# Patient Record
Sex: MALE | Race: WHITE | NOT HISPANIC OR LATINO | Employment: OTHER | URBAN - METROPOLITAN AREA
[De-identification: names, ages, dates, MRNs, and addresses within clinical notes are randomized per-mention and may not be internally consistent; named-entity substitution may affect disease eponyms.]

---

## 2024-05-18 ENCOUNTER — HOSPITAL ENCOUNTER (EMERGENCY)
Facility: HOSPITAL | Age: 74
Discharge: HOME | End: 2024-05-18
Attending: EMERGENCY MEDICINE
Payer: COMMERCIAL

## 2024-05-18 ENCOUNTER — APPOINTMENT (EMERGENCY)
Dept: CT IMAGING | Facility: HOSPITAL | Age: 74
End: 2024-05-18
Payer: COMMERCIAL

## 2024-05-18 VITALS
WEIGHT: 175 LBS | HEIGHT: 69 IN | BODY MASS INDEX: 25.92 KG/M2 | HEART RATE: 68 BPM | OXYGEN SATURATION: 99 % | DIASTOLIC BLOOD PRESSURE: 88 MMHG | TEMPERATURE: 97.9 F | SYSTOLIC BLOOD PRESSURE: 156 MMHG | RESPIRATION RATE: 18 BRPM

## 2024-05-18 DIAGNOSIS — N32.89 BLADDER MASS: Primary | ICD-10-CM

## 2024-05-18 DIAGNOSIS — R31.9 HEMATURIA: ICD-10-CM

## 2024-05-18 LAB
ALBUMIN SERPL BCP-MCNC: 4 G/DL (ref 3.5–5)
ALP SERPL-CCNC: 30 U/L (ref 34–104)
ALT SERPL W P-5'-P-CCNC: 23 U/L (ref 7–52)
ANION GAP SERPL CALCULATED.3IONS-SCNC: 5 MMOL/L (ref 4–13)
AST SERPL W P-5'-P-CCNC: 45 U/L (ref 13–39)
BACTERIA UR QL AUTO: ABNORMAL /HPF
BASOPHILS # BLD AUTO: 0.03 THOUSANDS/ÂΜL (ref 0–0.1)
BASOPHILS NFR BLD AUTO: 1 % (ref 0–1)
BILIRUB SERPL-MCNC: 0.78 MG/DL (ref 0.2–1)
BILIRUB UR QL STRIP: NEGATIVE
BUN SERPL-MCNC: 19 MG/DL (ref 5–25)
CALCIUM SERPL-MCNC: 8.9 MG/DL (ref 8.4–10.2)
CHLORIDE SERPL-SCNC: 108 MMOL/L (ref 96–108)
CK SERPL-CCNC: 824 U/L (ref 39–308)
CLARITY UR: CLEAR
CO2 SERPL-SCNC: 26 MMOL/L (ref 21–32)
COLOR UR: COLORLESS
CREAT SERPL-MCNC: 0.83 MG/DL (ref 0.6–1.3)
EOSINOPHIL # BLD AUTO: 0.15 THOUSAND/ÂΜL (ref 0–0.61)
EOSINOPHIL NFR BLD AUTO: 3 % (ref 0–6)
ERYTHROCYTE [DISTWIDTH] IN BLOOD BY AUTOMATED COUNT: 13.2 % (ref 11.6–15.1)
GFR SERPL CREATININE-BSD FRML MDRD: 86 ML/MIN/1.73SQ M
GLUCOSE SERPL-MCNC: 97 MG/DL (ref 65–140)
GLUCOSE UR STRIP-MCNC: NEGATIVE MG/DL
HCT VFR BLD AUTO: 42.1 % (ref 36.5–49.3)
HGB BLD-MCNC: 14.8 G/DL (ref 12–17)
HGB UR QL STRIP.AUTO: ABNORMAL
IMM GRANULOCYTES # BLD AUTO: 0.01 THOUSAND/UL (ref 0–0.2)
IMM GRANULOCYTES NFR BLD AUTO: 0 % (ref 0–2)
KETONES UR STRIP-MCNC: NEGATIVE MG/DL
LEUKOCYTE ESTERASE UR QL STRIP: NEGATIVE
LYMPHOCYTES # BLD AUTO: 1.21 THOUSANDS/ÂΜL (ref 0.6–4.47)
LYMPHOCYTES NFR BLD AUTO: 27 % (ref 14–44)
MCH RBC QN AUTO: 32.9 PG (ref 26.8–34.3)
MCHC RBC AUTO-ENTMCNC: 35.2 G/DL (ref 31.4–37.4)
MCV RBC AUTO: 94 FL (ref 82–98)
MONOCYTES # BLD AUTO: 0.78 THOUSAND/ÂΜL (ref 0.17–1.22)
MONOCYTES NFR BLD AUTO: 18 % (ref 4–12)
MUCOUS THREADS UR QL AUTO: ABNORMAL
NEUTROPHILS # BLD AUTO: 2.28 THOUSANDS/ÂΜL (ref 1.85–7.62)
NEUTS SEG NFR BLD AUTO: 51 % (ref 43–75)
NITRITE UR QL STRIP: NEGATIVE
NON-SQ EPI CELLS URNS QL MICRO: ABNORMAL /HPF
NRBC BLD AUTO-RTO: 0 /100 WBCS
PH UR STRIP.AUTO: 6 [PH]
PLATELET # BLD AUTO: 194 THOUSANDS/UL (ref 149–390)
PMV BLD AUTO: 9.8 FL (ref 8.9–12.7)
POTASSIUM SERPL-SCNC: 4.1 MMOL/L (ref 3.5–5.3)
PROT SERPL-MCNC: 6.5 G/DL (ref 6.4–8.4)
PROT UR STRIP-MCNC: ABNORMAL MG/DL
RBC # BLD AUTO: 4.5 MILLION/UL (ref 3.88–5.62)
RBC #/AREA URNS AUTO: ABNORMAL /HPF
SODIUM SERPL-SCNC: 139 MMOL/L (ref 135–147)
SP GR UR STRIP.AUTO: 1.01 (ref 1–1.03)
UROBILINOGEN UR STRIP-ACNC: <2 MG/DL
WBC # BLD AUTO: 4.46 THOUSAND/UL (ref 4.31–10.16)
WBC #/AREA URNS AUTO: ABNORMAL /HPF

## 2024-05-18 PROCEDURE — 99283 EMERGENCY DEPT VISIT LOW MDM: CPT

## 2024-05-18 PROCEDURE — 99284 EMERGENCY DEPT VISIT MOD MDM: CPT | Performed by: EMERGENCY MEDICINE

## 2024-05-18 PROCEDURE — 36415 COLL VENOUS BLD VENIPUNCTURE: CPT | Performed by: EMERGENCY MEDICINE

## 2024-05-18 PROCEDURE — 80053 COMPREHEN METABOLIC PANEL: CPT | Performed by: EMERGENCY MEDICINE

## 2024-05-18 PROCEDURE — 81001 URINALYSIS AUTO W/SCOPE: CPT | Performed by: EMERGENCY MEDICINE

## 2024-05-18 PROCEDURE — 82550 ASSAY OF CK (CPK): CPT

## 2024-05-18 PROCEDURE — 74176 CT ABD & PELVIS W/O CONTRAST: CPT

## 2024-05-18 PROCEDURE — 85025 COMPLETE CBC W/AUTO DIFF WBC: CPT | Performed by: EMERGENCY MEDICINE

## 2024-05-18 NOTE — DISCHARGE INSTRUCTIONS
As discussed, 2.5 x 3.7 x 2.7 cm mass was appreciated in the bladder and likely responsible for your blood in the urine today.    Contact your urologist on Monday or Tuesday and arrange for follow-up closer than the scheduled June 18 appointment.    Reseek medical attention if you have difficulty urinating or are passing a large amount of blood.

## 2024-05-18 NOTE — ED PROVIDER NOTES
History  Chief Complaint   Patient presents with    Blood in Urine     Pt reports coca cola colored urine onset last night, denies painful urination, denies hx of same     Patient is a 73-year-old male presents to the emergency department for evaluation after experiencing dark coloring of his urine.  He explains that over the last 2 days he has been more physically active than typical helping his son replace a shed roof.  Yesterday afternoon and yesterday evening he voided and appreciated very dark coloring (brown as shown to me in a picture).  This morning's void was slightly lighter in color and the next 1 here is closer to an orange.  He did not appreciate any other abnormal sensation with this.  He has not had urinary frequency, urgency, dysuria or malodor.  No nausea or vomiting.  No changes or problems with bowel movements.  He does note some discomfort in his knees and shoulders though does have chronic arthritis and notes that he was kneeling on the knees and swinging a hammer.  Hands were additionally sore and have been improving in pain.  He reports bony/joint pain as a 1 out of 10 at this time.  No known bad food ingestions.  No history of prior darkened urine.  He does have BPH and follows with urologist regularly with most recent appointment having been in February and upcoming 1 in June.  On occasion in the past he has had urinary frequency/urgency though not recently.  No known history of ureterolithiasis.  Medical history is significant for hypertension for which he was switched from amlodipine to lisinopril approximately 6 weeks ago.  He additionally has been on hydroxychloroquine for approximately 10 years to treat his arthritis.  No recent change in vitamins.        None       Past Medical History:   Diagnosis Date    Arthritis     Hypertension        History reviewed. No pertinent surgical history.    History reviewed. No pertinent family history.  I have reviewed and agree with the history as  documented.    E-Cigarette/Vaping     E-Cigarette/Vaping Substances     Social History     Tobacco Use    Smoking status: Former     Types: Cigarettes    Smokeless tobacco: Never       Review of Systems   Constitutional:  Negative for fever.   HENT:  Positive for sore throat (Very slight on Tuesday and Wednesday of this week-now resolved, accompanied by mild rhinorrhea on Wednesday which he attributed to allergies).    Respiratory:  Negative for shortness of breath.    Cardiovascular:  Negative for chest pain.   All other systems reviewed and are negative.      Physical Exam  Physical Exam  Vitals and nursing note reviewed.   Constitutional:       Appearance: Normal appearance.   HENT:      Head: Normocephalic.      Mouth/Throat:      Mouth: Mucous membranes are moist.   Eyes:      General: No scleral icterus.  Cardiovascular:      Rate and Rhythm: Normal rate and regular rhythm.   Pulmonary:      Effort: Pulmonary effort is normal.      Breath sounds: Normal breath sounds.   Abdominal:      General: Bowel sounds are normal.      Palpations: Abdomen is soft.      Tenderness: There is abdominal tenderness (Very slight discomfort left lower quadrant, pressure sensation). There is no right CVA tenderness or left CVA tenderness.   Musculoskeletal:         General: Normal range of motion.   Skin:     General: Skin is warm and dry.   Neurological:      General: No focal deficit present.      Mental Status: He is alert and oriented to person, place, and time.   Psychiatric:         Mood and Affect: Mood normal.         Behavior: Behavior normal.         Vital Signs  ED Triage Vitals [05/18/24 1102]   Temperature Pulse Respirations Blood Pressure SpO2   97.9 °F (36.6 °C) 66 16 (!) 199/95 96 %      Temp Source Heart Rate Source Patient Position - Orthostatic VS BP Location FiO2 (%)   Oral Monitor Sitting Right arm --      Pain Score       No Pain           Vitals:    05/18/24 1102 05/18/24 1354   BP: (!) 199/95 156/88    Pulse: 66 68   Patient Position - Orthostatic VS: Sitting Sitting         Visual Acuity      ED Medications  Medications - No data to display    Diagnostic Studies  Results Reviewed       Procedure Component Value Units Date/Time    CK [[id number]]  (Abnormal) Collected: 05/18/24 1119    Lab Status: Final result Specimen: Blood from Arm, Left Updated: 05/18/24 1313     Total  U/L     Comprehensive metabolic panel [[id number]]  (Abnormal) Collected: 05/18/24 1119    Lab Status: Final result Specimen: Blood from Arm, Left Updated: 05/18/24 1149     Sodium 139 mmol/L      Potassium 4.1 mmol/L      Chloride 108 mmol/L      CO2 26 mmol/L      ANION GAP 5 mmol/L      BUN 19 mg/dL      Creatinine 0.83 mg/dL      Glucose 97 mg/dL      Calcium 8.9 mg/dL      AST 45 U/L      ALT 23 U/L      Alkaline Phosphatase 30 U/L      Total Protein 6.5 g/dL      Albumin 4.0 g/dL      Total Bilirubin 0.78 mg/dL      eGFR 86 ml/min/1.73sq m     Narrative:      [employer] guidelines for Chronic Kidney Disease (CKD):     Stage 1 with normal or high GFR (GFR > 90 mL/min/1.73 square meters)    Stage 2 Mild CKD (GFR = 60-89 mL/min/1.73 square meters)    Stage 3A Moderate CKD (GFR = 45-59 mL/min/1.73 square meters)    Stage 3B Moderate CKD (GFR = 30-44 mL/min/1.73 square meters)    Stage 4 Severe CKD (GFR = 15-29 mL/min/1.73 square meters)    Stage 5 End Stage CKD (GFR <15 mL/min/1.73 square meters)  Note: GFR calculation is accurate only with a steady state creatinine    Urine Microscopic [[id number]]  (Abnormal) Collected: 05/18/24 1120    Lab Status: Final result Specimen: Urine, Clean Catch Updated: 05/18/24 1131     RBC, UA Innumerable /hpf      WBC, UA 4-10 /hpf      Epithelial Cells Occasional /hpf      Bacteria, UA Occasional /hpf      MUCUS THREADS Occasional    CBC and differential [[id number]]  (Abnormal) Collected: 05/18/24 1119    Lab Status: Final result Specimen: Blood from Arm, Left Updated: 05/18/24  1128     WBC 4.46 Thousand/uL      RBC 4.50 Million/uL      Hemoglobin 14.8 g/dL      Hematocrit 42.1 %      MCV 94 fL      MCH 32.9 pg      MCHC 35.2 g/dL      RDW 13.2 %      MPV 9.8 fL      Platelets 194 Thousands/uL      nRBC 0 /100 WBCs      Segmented % 51 %      Immature Grans % 0 %      Lymphocytes % 27 %      Monocytes % 18 %      Eosinophils Relative 3 %      Basophils Relative 1 %      Absolute Neutrophils 2.28 Thousands/µL      Absolute Immature Grans 0.01 Thousand/uL      Absolute Lymphocytes 1.21 Thousands/µL      Absolute Monocytes 0.78 Thousand/µL      Eosinophils Absolute 0.15 Thousand/µL      Basophils Absolute 0.03 Thousands/µL     UA w Reflex to Microscopic w Reflex to Culture [[id number]]  (Abnormal) Collected: 05/18/24 1120    Lab Status: Final result Specimen: Urine, Clean Catch Updated: 05/18/24 1128     Color, UA Colorless     Clarity, UA Clear     Specific Gravity, UA 1.013     pH, UA 6.0     Leukocytes, UA Negative     Nitrite, UA Negative     Protein, UA Trace mg/dl      Glucose, UA Negative mg/dl      Ketones, UA Negative mg/dl      Urobilinogen, UA <2.0 mg/dl      Bilirubin, UA Negative     Occult Blood, UA Large                   CT renal stone study abdomen pelvis without contrast   Final Result by Chris Day MD (05/18 1322)      1.  2.5 x 3.7 x 2.7 cm bladder tumor. A large blood clot is less likely but cannot be entirely excluded on this unenhanced CT. This is best evaluated with cystoscopy.      2.  Assessment for the presence of upper tract neoplasia cannot be assessed on this low-dose unenhanced CT.      3.  No urinary tract calculus.      4.  Several low-attenuation liver masses, incompletely characterized on this unenhanced CT, although most likely simple liver cysts.      5.  Cholelithiasis without evidence of acute cholecystitis.         The study was marked in EPIC for immediate notification.      Workstation performed: [id number]                     Procedures  Procedures         ED Course      ED Course as of 05/18/24 1635   Sat May 18, 2024   1131 Differential diagnosis includes but is not limited to rhabdomyolysis, ureterolithiasis, UTI, hematuria from alternate source and urinary tract such as cyst or mass, nephritis, hepatitis.   1133 Urinalysis has returned.  This is negative for leukocyte esterase and nitrates suggesting away from infection.  In urine remarkable red blood cells and only occasional bacteria are visualized.  At this time ureterolithiasis, alternate bleeding source throughout the urinary tract including glomerulonephritis are near top of differential.   1245 Normal renal function.   1357 CT notable for mass within the bladder 3.7 cm in maximal dimension.  This is likely responsible for the blood in his urine.  He notes that his last 2 voids have been even lighter than the specimen he gave here.  Hard to predict course of further bleeding.  We did discuss potential for obstruction should bleeding become much heavier and need for return to an emergency department for possible catheterization should that occur.    He does now reside in [place] and will be returning there on Monday (in 2 days) he has already established care with Dr. Bill Patino (urologist).  Current follow-up appointment is arranged for June 18.  He is aware to contact the office on Monday or Tuesday to discuss this weekend's events and arrange for sooner follow-up.  He is being provided with copy of laboratory studies, CT report as well as a disc with the CT images themselves.   1359 CK has returned elevated.  Likely this is secondary to his increased activity with roof repair.  He does not have diffuse myalgias, is hydrating well and has no impairment in renal function on laboratory testing.  No specific treatment needed for this.                                           Medical Decision Making  Amount and/or Complexity of Data Reviewed  Labs:  ordered.  Radiology: ordered.             Disposition  Final diagnoses:   Bladder mass   Hematuria     Time reflects when diagnosis was documented in both MDM as applicable and the Disposition within this note       Time User Action Codes Description Comment    5/18/2024  1:46 PM Tori Cid Add [N32.89] Bladder mass     5/18/2024  1:47 PM Tori Cid Add [R31.9] Hematuria           ED Disposition       ED Disposition   Discharge    Condition   Stable    Date/Time   Sat May 18, 2024  1:46 PM    Comment   Santosh Knowles discharge to home/self care.                   Follow-up Information       Follow up With Specialties Details Why Contact Info    Dr. Bill Patino-[facility]  Call  To inform office staff of this weekends blood in the urine and emergency department evaluation revealing presence of mass in the bladder. [phone]            There are no discharge medications for this patient.      No discharge procedures on file.    PDMP Review       None            ED Provider  Electronically Signed by             Tori Cid MD  05/18/24 6223